# Patient Record
Sex: MALE | Race: WHITE | URBAN - METROPOLITAN AREA
[De-identification: names, ages, dates, MRNs, and addresses within clinical notes are randomized per-mention and may not be internally consistent; named-entity substitution may affect disease eponyms.]

---

## 2022-09-12 ENCOUNTER — OFFICE VISIT (OUTPATIENT)
Dept: URBAN - METROPOLITAN AREA CLINIC 41 | Facility: CLINIC | Age: 67
End: 2022-09-12
Payer: MEDICARE

## 2022-09-12 DIAGNOSIS — H25.13 AGE-RELATED NUCLEAR CATARACT, BILATERAL: ICD-10-CM

## 2022-09-12 DIAGNOSIS — H33.011 RETINAL DETACHMENT WITH SINGLE BREAK, RIGHT EYE: Primary | ICD-10-CM

## 2022-09-12 PROCEDURE — 92134 CPTRZ OPH DX IMG PST SGM RTA: CPT | Performed by: OPHTHALMOLOGY

## 2022-09-12 PROCEDURE — 99205 OFFICE O/P NEW HI 60 MIN: CPT | Performed by: OPHTHALMOLOGY

## 2022-09-12 RX ORDER — OFLOXACIN 3 MG/ML
0.3 % SOLUTION/ DROPS OPHTHALMIC
Qty: 5 | Refills: 0 | Status: INACTIVE
Start: 2022-09-12 | End: 2022-10-11

## 2022-09-12 RX ORDER — PREDNISOLONE ACETATE 10 MG/ML
1 % SUSPENSION/ DROPS OPHTHALMIC
Qty: 5 | Refills: 3 | Status: INACTIVE
Start: 2022-09-12 | End: 2022-12-10

## 2022-09-12 ASSESSMENT — INTRAOCULAR PRESSURE
OD: 12
OS: 13

## 2022-09-12 NOTE — IMPRESSION/PLAN
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 - weeks to months of vision loss OD. 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 The diagnosis, natural history, and prognosis of RRD, as well as the R/B/A of the various surgical interventions were discussed. The importance of timely intervention was emphasized with the patient. Altitude precautions with a gas bubble were discussed, including the danger of loss of the eye with travel to a higher altitude or flying. The possible need to update spectacle correction if a scleral buckle is used was explained. The 10% risk of re-detachment and PVR were explained to the patient. The patient understands that the vision usually improves gradually over a period of months to years, but may not improve to prior levels, especially when the macula is involved in the RD. The patient elects to proceed with retinal detachment repair.   

Will plan on SB/25G PPV/MP/ICG/ILM/Gas vs SOin x RD/PVR

## 2022-09-14 ENCOUNTER — POST-OPERATIVE VISIT (OUTPATIENT)
Dept: URBAN - METROPOLITAN AREA CLINIC 27 | Facility: CLINIC | Age: 67
End: 2022-09-14
Payer: MEDICARE

## 2022-09-14 DIAGNOSIS — H33.41 PROLIFERATIVE VITREO-RETINOPATHY W/ RETINAL DETACHMENT OF RIGHT EYE: Primary | ICD-10-CM

## 2022-09-14 PROCEDURE — 99024 POSTOP FOLLOW-UP VISIT: CPT | Performed by: OPHTHALMOLOGY

## 2022-09-14 ASSESSMENT — INTRAOCULAR PRESSURE
OD: 14
OS: 14

## 2022-09-14 NOTE — IMPRESSION/PLAN
Impression: S/P SB/25G PPV/MP/ICG/ILM/Gas vs SOin x RD/PVR OD - 09/13/2022 (SI) Plan: Retina is attached. No s/s of infection IOP is good at (14) Drops Vigamox QID Pred QID 

RTC 1 week

## 2022-09-21 ENCOUNTER — POST-OPERATIVE VISIT (OUTPATIENT)
Dept: URBAN - METROPOLITAN AREA CLINIC 27 | Facility: CLINIC | Age: 67
End: 2022-09-21
Payer: MEDICARE

## 2022-09-21 DIAGNOSIS — H33.41 PROLIFERATIVE VITREO-RETINOPATHY W/ RETINAL DETACHMENT OF RIGHT EYE: Primary | ICD-10-CM

## 2022-09-21 PROCEDURE — 99024 POSTOP FOLLOW-UP VISIT: CPT | Performed by: OPHTHALMOLOGY

## 2022-09-21 ASSESSMENT — INTRAOCULAR PRESSURE
OS: 14
OD: 13

## 2022-09-21 NOTE — IMPRESSION/PLAN
Impression: S/P SB/25G PPV/MP/ICG/ILM/Gas x RD/PVR OD  09/13/2022 (SI) Plan: Retina is attached. No s/s of infection IOP is good at (13) Drops Vigamox QID until done Pred QID 

RTC 2-3 weeks

## 2022-10-13 ENCOUNTER — POST-OPERATIVE VISIT (OUTPATIENT)
Dept: URBAN - METROPOLITAN AREA CLINIC 41 | Facility: CLINIC | Age: 67
End: 2022-10-13
Payer: MEDICARE

## 2022-10-13 DIAGNOSIS — H33.41 PROLIFERATIVE VITREO-RETINOPATHY W/ RETINAL DETACHMENT OF RIGHT EYE: Primary | ICD-10-CM

## 2022-10-13 PROCEDURE — 92134 CPTRZ OPH DX IMG PST SGM RTA: CPT | Performed by: OPHTHALMOLOGY

## 2022-10-13 PROCEDURE — 99024 POSTOP FOLLOW-UP VISIT: CPT | Performed by: OPHTHALMOLOGY

## 2022-10-13 ASSESSMENT — INTRAOCULAR PRESSURE
OD: 14
OS: 18

## 2022-10-13 NOTE — IMPRESSION/PLAN
Impression: S/P SB/25G PPV/MP/ICG/ILM/Gas vs SOin x RD/PVR OD - 30 Days. 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 Plan: --retina attached
--no s/s infection
--IOP acceptable
--OCT shows mild ERM
--RD/endoph WS discussed
--alt restrictions/positioning discussed RTC 1 month PO/OCT OD w/ SI

## 2022-11-10 ENCOUNTER — POST-OPERATIVE VISIT (OUTPATIENT)
Dept: URBAN - METROPOLITAN AREA CLINIC 27 | Facility: CLINIC | Age: 67
End: 2022-11-10
Payer: MEDICARE

## 2022-11-10 DIAGNOSIS — H33.41 TRACTION DETACHMENT OF RETINA, RIGHT EYE: Primary | ICD-10-CM

## 2022-11-10 PROCEDURE — 92134 CPTRZ OPH DX IMG PST SGM RTA: CPT | Performed by: OPHTHALMOLOGY

## 2022-11-10 PROCEDURE — 99024 POSTOP FOLLOW-UP VISIT: CPT | Performed by: OPHTHALMOLOGY

## 2022-11-10 ASSESSMENT — INTRAOCULAR PRESSURE
OD: 14
OS: 15

## 2022-11-10 NOTE — IMPRESSION/PLAN
Impression: S/P 25G PPV/MP/ICG/ILM/C3F8 OD - 58 Days. 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 Excellent post op course   Post operative instructions reviewed -
- OCT shows ERM/PVR peeled without recurrence or CME Plan: Looks great. outer retinal layers are improving slowly. + cataract.  No regrowth of PVR

RTC 2 mo with OCT OU

## 2023-01-05 ENCOUNTER — OFFICE VISIT (OUTPATIENT)
Dept: URBAN - METROPOLITAN AREA CLINIC 27 | Facility: CLINIC | Age: 68
End: 2023-01-05
Payer: MEDICARE

## 2023-01-05 DIAGNOSIS — H25.13 AGE-RELATED NUCLEAR CATARACT, BILATERAL: ICD-10-CM

## 2023-01-05 DIAGNOSIS — H33.41 PROLIFERATIVE VITREO-RETINOPATHY W/ RETINAL DETACHMENT OF RIGHT EYE: Primary | ICD-10-CM

## 2023-01-05 PROCEDURE — 92134 CPTRZ OPH DX IMG PST SGM RTA: CPT | Performed by: OPHTHALMOLOGY

## 2023-01-05 PROCEDURE — 99214 OFFICE O/P EST MOD 30 MIN: CPT | Performed by: OPHTHALMOLOGY

## 2023-01-05 ASSESSMENT — INTRAOCULAR PRESSURE
OS: 18
OD: 18

## 2023-01-05 NOTE — IMPRESSION/PLAN
Impression: Age-related nuclear cataract, bilateral: H25.13. Plan: + worsening after PPV OD. Exam demonstrates a cataract which is visually significant. Discussed R/B/A of surgery vs observation. Recommend evaluation for surgery. Discussed that visual prognosis after cataract surgery is unclear given concurrent retinal disease and history of mac off RD with months of vision loss before surgery, but the patient can still benefit from evaluation and possible surgery.

## 2023-01-05 NOTE — IMPRESSION/PLAN
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 
- months of  vision loss before presenting with evaluation, 
- S/P 25G PPV/MP/ICG/ILM/C3F8 OD 
- OCT shows ERM/PVR peeled without recurrence or CME Plan: Exam and OCT demonstrate retina attached without recurrence of PVR. OCT shows outer retinal layers are improving slowly. + cataract. Will refer for evaluation.  

RTC 2-3 mo with OCT OU

## 2023-01-11 ENCOUNTER — OFFICE VISIT (OUTPATIENT)
Dept: URBAN - METROPOLITAN AREA CLINIC 30 | Facility: CLINIC | Age: 68
End: 2023-01-11
Payer: MEDICARE

## 2023-01-11 DIAGNOSIS — H25.811 COMBINED FORMS OF AGE-RELATED CATARACT, RIGHT EYE: ICD-10-CM

## 2023-01-11 DIAGNOSIS — H33.41 TRACTION DETACHMENT OF RETINA, RIGHT EYE: Primary | ICD-10-CM

## 2023-01-11 PROCEDURE — 99204 OFFICE O/P NEW MOD 45 MIN: CPT | Performed by: STUDENT IN AN ORGANIZED HEALTH CARE EDUCATION/TRAINING PROGRAM

## 2023-01-11 PROCEDURE — 92134 CPTRZ OPH DX IMG PST SGM RTA: CPT | Performed by: STUDENT IN AN ORGANIZED HEALTH CARE EDUCATION/TRAINING PROGRAM

## 2023-01-11 ASSESSMENT — INTRAOCULAR PRESSURE
OS: 20
OD: 20

## 2023-01-11 ASSESSMENT — VISUAL ACUITY
OD: 20/300
OS: 20/30

## 2023-01-11 ASSESSMENT — KERATOMETRY
OD: 44.32
OS: 44.35

## 2023-01-11 NOTE — IMPRESSION/PLAN
Impression: Combined forms of age-related cataract, right eye: H25.811. Plan: Pt ed contributes to glare concerns but nor primary cause of reduced vision. Pt ed h/o RD & repair will limit vision even with cat sx. Extensive discussion with pt & pt's wife that given moderate cat and pt not symptomatic for significant changes since RD repair recommend holding cat sx.  RTC for repeat cat eval in 6 mo, sooner if vision changes

## 2023-01-11 NOTE — IMPRESSION/PLAN
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 
-- S/P 25G PPV/MP/ICG/ILM/C3F8 OD Plan: Under care of Dr Paola Mirza, last exam 1/4/23 with recommendations for cat eval despite h/o RD OD. Retina attached, OCT mac with loss of fov pit OD, normal OS.  RTC 4 mo as scheduled with Dr Paola Mirza

## 2023-04-13 ENCOUNTER — OFFICE VISIT (OUTPATIENT)
Dept: URBAN - METROPOLITAN AREA CLINIC 27 | Facility: CLINIC | Age: 68
End: 2023-04-13
Payer: MEDICARE

## 2023-04-13 DIAGNOSIS — H25.13 AGE-RELATED NUCLEAR CATARACT, BILATERAL: ICD-10-CM

## 2023-04-13 DIAGNOSIS — H33.41 PROLIFERATIVE VITREO-RETINOPATHY W/ RETINAL DETACHMENT OF RIGHT EYE: Primary | ICD-10-CM

## 2023-04-13 PROCEDURE — 92012 INTRM OPH EXAM EST PATIENT: CPT | Performed by: OPHTHALMOLOGY

## 2023-04-13 PROCEDURE — 92134 CPTRZ OPH DX IMG PST SGM RTA: CPT | Performed by: OPHTHALMOLOGY

## 2023-04-13 ASSESSMENT — INTRAOCULAR PRESSURE
OS: 18
OD: 17

## 2023-04-13 NOTE — IMPRESSION/PLAN
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 
- months of  vision loss before presenting with evaluation, 
- S/P 25G PPV/MP/ICG/ILM/C3F8 OD 
- OCT shows ERM/PVR peeled without recurrence or CME Plan: Exam and OCT demonstrate retina attached without recurrence of PVR. OCT shows outer retinal layers are improving slowly. + cataract. Will refer for evaluation.  

RTC 9-12 mo with OCT OU

## 2023-07-12 ENCOUNTER — OFFICE VISIT (OUTPATIENT)
Dept: URBAN - METROPOLITAN AREA CLINIC 30 | Facility: CLINIC | Age: 68
End: 2023-07-12
Payer: MEDICARE

## 2023-07-12 DIAGNOSIS — H33.41 PROLIFERATIVE VITREO-RETINOPATHY W/ RETINAL DETACHMENT OF RIGHT EYE: ICD-10-CM

## 2023-07-12 DIAGNOSIS — H25.13 AGE-RELATED NUCLEAR CATARACT, BILATERAL: Primary | ICD-10-CM

## 2023-07-12 PROCEDURE — 92134 CPTRZ OPH DX IMG PST SGM RTA: CPT

## 2023-07-12 PROCEDURE — 99214 OFFICE O/P EST MOD 30 MIN: CPT

## 2023-07-12 ASSESSMENT — KERATOMETRY
OS: 44.41
OD: 24.27

## 2023-07-12 ASSESSMENT — VISUAL ACUITY
OS: 20/25
OD: 20/300

## 2023-07-12 ASSESSMENT — INTRAOCULAR PRESSURE
OD: 18
OS: 18

## 2023-07-12 NOTE — IMPRESSION/PLAN
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 
- months of  vision loss before presenting with evaluation, 
- S/P 25G PPV/MP/ICG/ILM/C3F8 OD 
- OCT shows ERM/PVR peeled without recurrence or CME Plan: Exam and OCT demonstrate retina attached without recurrence of PVR. Pt of Dr. Kiera Mason. Saw 4/2023 and cleared for sx.

## 2023-07-12 NOTE — IMPRESSION/PLAN
Impression: Age-related nuclear cataract, bilateral: H25.13. Plan: Discussed cataract diagnosis with the patient. Discussed and reviewed treatment options for cataracts. Patient elects surgical treatment. Recommend surgery OD only for now. Risk for inflammation, use Prednisone/Diclofenac drops. Patient is candidate/interested in multifocal, toric, standard, LenSx and ORA. Aim OD: -2.00. Patient will need glasses for distance work (if near aim). 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 Pt understands [facility] limited by RD.

## 2023-07-27 ENCOUNTER — ADULT PHYSICAL (OUTPATIENT)
Dept: URBAN - METROPOLITAN AREA CLINIC 24 | Facility: CLINIC | Age: 68
End: 2023-07-27
Payer: MEDICARE

## 2023-07-27 DIAGNOSIS — Z01.818 ENCOUNTER FOR OTHER PREPROCEDURAL EXAMINATION: Primary | ICD-10-CM

## 2023-07-27 DIAGNOSIS — H25.811 COMBINED FORMS OF AGE-RELATED CATARACT, RIGHT EYE: Primary | ICD-10-CM

## 2023-07-27 PROCEDURE — 99202 OFFICE O/P NEW SF 15 MIN: CPT | Performed by: PHYSICIAN ASSISTANT

## 2023-07-27 ASSESSMENT — PACHYMETRY
OD: 25.51
OS: 25.23
OD: 3.87
OS: 3.92

## 2023-08-02 ENCOUNTER — PRE-OPERATIVE VISIT (OUTPATIENT)
Dept: URBAN - METROPOLITAN AREA CLINIC 24 | Facility: CLINIC | Age: 68
End: 2023-08-02
Payer: MEDICARE

## 2023-08-02 DIAGNOSIS — H33.41 PROLIFERATIVE VITREO-RETINOPATHY W/ RETINAL DETACHMENT OF RIGHT EYE: ICD-10-CM

## 2023-08-02 DIAGNOSIS — H52.13 MYOPIA, BILATERAL: ICD-10-CM

## 2023-08-02 DIAGNOSIS — H52.203 BILATERAL ASTIGMATISM: ICD-10-CM

## 2023-08-02 DIAGNOSIS — H25.13 AGE-RELATED NUCLEAR CATARACT, BILATERAL: Primary | ICD-10-CM

## 2023-08-02 PROCEDURE — 99204 OFFICE O/P NEW MOD 45 MIN: CPT | Performed by: OPHTHALMOLOGY

## 2023-08-02 PROCEDURE — 92136 OPHTHALMIC BIOMETRY: CPT | Performed by: OPHTHALMOLOGY

## 2023-08-02 ASSESSMENT — INTRAOCULAR PRESSURE
OD: 16
OS: 13

## 2023-08-08 ENCOUNTER — SURGERY (OUTPATIENT)
Dept: URBAN - METROPOLITAN AREA SURGERY 12 | Facility: SURGERY | Age: 68
End: 2023-08-08
Payer: MEDICARE

## 2023-08-08 DIAGNOSIS — H25.811 COMBINED FORMS OF AGE-RELATED CATARACT, RIGHT EYE: Primary | ICD-10-CM

## 2023-08-08 PROCEDURE — 66984 XCAPSL CTRC RMVL W/O ECP: CPT | Performed by: OPHTHALMOLOGY

## 2023-08-09 ENCOUNTER — POST-OPERATIVE VISIT (OUTPATIENT)
Dept: URBAN - METROPOLITAN AREA CLINIC 30 | Facility: CLINIC | Age: 68
End: 2023-08-09
Payer: MEDICARE

## 2023-08-09 DIAGNOSIS — Z48.810 ENCOUNTER FOR SURGICAL AFTERCARE FOLLOWING SURGERY ON A SENSE ORGAN: Primary | ICD-10-CM

## 2023-08-09 PROCEDURE — 99024 POSTOP FOLLOW-UP VISIT: CPT

## 2023-08-09 ASSESSMENT — INTRAOCULAR PRESSURE: OD: 24

## 2023-09-05 ENCOUNTER — POST-OPERATIVE VISIT (OUTPATIENT)
Dept: URBAN - METROPOLITAN AREA CLINIC 30 | Facility: CLINIC | Age: 68
End: 2023-09-05
Payer: MEDICARE

## 2023-09-05 DIAGNOSIS — Z48.810 ENCOUNTER FOR SURGICAL AFTERCARE FOLLOWING SURGERY ON A SENSE ORGAN: Primary | ICD-10-CM

## 2023-09-05 PROCEDURE — 99024 POSTOP FOLLOW-UP VISIT: CPT

## 2023-09-05 ASSESSMENT — VISUAL ACUITY
OD: 20/100
OS: 20/25

## 2023-09-05 ASSESSMENT — INTRAOCULAR PRESSURE
OD: 19
OS: 16

## 2024-03-05 ENCOUNTER — OFFICE VISIT (OUTPATIENT)
Dept: URBAN - METROPOLITAN AREA CLINIC 30 | Facility: CLINIC | Age: 69
End: 2024-03-05
Payer: MEDICARE

## 2024-03-05 DIAGNOSIS — H33.41 PROLIFERATIVE VITREO-RETINOPATHY W/ RETINAL DETACHMENT OF RIGHT EYE: ICD-10-CM

## 2024-03-05 DIAGNOSIS — H26.491 OTHER SECONDARY CATARACT, RIGHT EYE: Primary | ICD-10-CM

## 2024-03-05 DIAGNOSIS — Z96.1 PRESENCE OF INTRAOCULAR LENS: ICD-10-CM

## 2024-03-05 DIAGNOSIS — H25.12 AGE-RELATED NUCLEAR CATARACT, LEFT EYE: ICD-10-CM

## 2024-03-05 PROCEDURE — 99214 OFFICE O/P EST MOD 30 MIN: CPT

## 2024-03-05 PROCEDURE — 92134 CPTRZ OPH DX IMG PST SGM RTA: CPT

## 2024-03-05 ASSESSMENT — INTRAOCULAR PRESSURE
OS: 13
OD: 14

## 2024-03-05 ASSESSMENT — VISUAL ACUITY
OS: 20/50
OD: 20/400

## 2024-03-26 ENCOUNTER — SURGERY (OUTPATIENT)
Dept: URBAN - METROPOLITAN AREA SURGERY 12 | Facility: SURGERY | Age: 69
End: 2024-03-26
Payer: MEDICARE

## 2024-03-26 PROCEDURE — 66821 AFTER CATARACT LASER SURGERY: CPT | Performed by: OPHTHALMOLOGY

## 2024-04-25 ENCOUNTER — OFFICE VISIT (OUTPATIENT)
Dept: URBAN - METROPOLITAN AREA CLINIC 27 | Facility: CLINIC | Age: 69
End: 2024-04-25
Payer: MEDICARE

## 2024-04-25 DIAGNOSIS — H33.41 TRACTION DETACHMENT OF RETINA, RIGHT EYE: Primary | ICD-10-CM

## 2024-04-25 DIAGNOSIS — H25.13 AGE-RELATED NUCLEAR CATARACT, BILATERAL: ICD-10-CM

## 2024-04-25 DIAGNOSIS — Z96.1 PRESENCE OF INTRAOCULAR LENS: ICD-10-CM

## 2024-04-25 DIAGNOSIS — H25.12 AGE-RELATED NUCLEAR CATARACT, LEFT EYE: ICD-10-CM

## 2024-04-25 PROCEDURE — 92134 CPTRZ OPH DX IMG PST SGM RTA: CPT | Performed by: OPHTHALMOLOGY

## 2024-04-25 PROCEDURE — 92014 COMPRE OPH EXAM EST PT 1/>: CPT | Performed by: OPHTHALMOLOGY

## 2024-04-25 ASSESSMENT — INTRAOCULAR PRESSURE
OD: 11
OS: 11